# Patient Record
Sex: MALE
[De-identification: names, ages, dates, MRNs, and addresses within clinical notes are randomized per-mention and may not be internally consistent; named-entity substitution may affect disease eponyms.]

---

## 2020-08-04 ENCOUNTER — HOSPITAL ENCOUNTER (OUTPATIENT)
Dept: HOSPITAL 72 - PAN | Age: 60
Discharge: HOME | End: 2020-08-04
Payer: MEDICAID

## 2020-08-04 VITALS — BODY MASS INDEX: 32.14 KG/M2 | WEIGHT: 200 LBS | HEIGHT: 66 IN

## 2020-08-04 VITALS — SYSTOLIC BLOOD PRESSURE: 143 MMHG | DIASTOLIC BLOOD PRESSURE: 77 MMHG

## 2020-08-04 DIAGNOSIS — K63.5: Primary | ICD-10-CM

## 2020-08-04 DIAGNOSIS — K64.9: ICD-10-CM

## 2020-08-04 NOTE — GENERAL PROGRESS NOTE
Assessment/Plan


Assessment/Plan:


s/p colonoscopy


4 polyps


hemorrhoids





anusol HC


repeat colon in 3 years





Subjective


ROS Limited/Unobtainable:  Yes





Objective





Last 24 Hour Vital Signs








  Date Time  Temp Pulse Resp B/P (MAP) Pulse Ox O2 Delivery O2 Flow Rate FiO2


 


8/4/20 10:32 98.0 68 16 143/77 (99) 100   








General Appearance:  alert


EENT:  normal ENT inspection


Neck:  supple


Cardiovascular:  normal rate


Respiratory/Chest:  decreased breath sounds


Abdomen:  normal bowel sounds, non tender, soft


Extremities:  non-tender











Chano Bui MD Aug 4, 2020 10:44